# Patient Record
Sex: FEMALE
[De-identification: names, ages, dates, MRNs, and addresses within clinical notes are randomized per-mention and may not be internally consistent; named-entity substitution may affect disease eponyms.]

---

## 2018-06-20 ENCOUNTER — HOSPITAL ENCOUNTER (OUTPATIENT)
Dept: HOSPITAL 88 - US | Age: 23
End: 2018-06-20
Payer: COMMERCIAL

## 2018-06-20 DIAGNOSIS — I10: Primary | ICD-10-CM

## 2018-06-20 DIAGNOSIS — J30.0: ICD-10-CM

## 2018-06-20 DIAGNOSIS — J02.9: ICD-10-CM

## 2018-06-20 PROCEDURE — 76770 US EXAM ABDO BACK WALL COMP: CPT

## 2018-06-20 PROCEDURE — 93976 VASCULAR STUDY: CPT

## 2018-06-20 PROCEDURE — 93306 TTE W/DOPPLER COMPLETE: CPT

## 2018-06-20 PROCEDURE — 71046 X-RAY EXAM CHEST 2 VIEWS: CPT

## 2018-06-20 NOTE — DIAGNOSTIC IMAGING REPORT
PROCEDURE:US RETROPERITONEAL ( KIDNEY ).

COMPARISON:None.

INDICATIONS:Hypertension

TECHNIQUE: Grey-scale and color sonographic images of the bilateral 

kidneys and bladder where obtained in transverse and longitudinal 

planes.

 

FINDINGS:

 

RIGHT KIDNEY: 13 x 4.6 x 4.5 cm, cortex 2 cm

Cysts: None

Solid masses: None

Stones: None

Hydronephrosis: Absent

Echogenicity: Normal

 

LEFT KIDNEY: 12.4 x 5.7 x 6 cm, cortex 2.4 cm

Cysts: None

Solid masses: None

Stones: None

Hydronephrosis: Absent

Echogenicity: Normal

 

Bladder: Unremarkable. Both ureteral jets visualized.

 

CONCLUSION:

Unremarkable renal ultrasound. 

 

Dictated by:  Pedrito Alexander M.D. on 6/20/2018 at 10:54     

Electronically approved by:  Pedrito Alexander M.D. on 6/20/2018 at 10:54

## 2018-06-20 NOTE — DIAGNOSTIC IMAGING REPORT
PROCEDURE: RENAL DOPPLER ULTRASOUND

 

COMPARISON:None.

 

INDICATIONS:Hypertension

 

FINDINGS:

Multiple sagittal and axial images of the right and left kidneys were 

obtained.

 

RIGHT KIDNEY:

The right kidney measures 13 cm. The cortical thickness is 2 cm.

The right kidney has normal echogenicity. There are no masses, 

hydronephrosis or calculi.

 

The highest right main renal artery PSV is 150 cm/sec.

The highest right segmental artery PSV is 110 cm/sec.

 

LEFT KIDNEY:

The left kidney measures 12.4 cm. The cortical thickness is 2.4 cm.

The left kidney has normal echogenicity. There are no masses, 

hydronephrosis or calculi.

 

The highest left main renal artery PSV is 185 cm/sec.

The highest left segmental artery PSV is 99 cm/sec.

 

The abdominal aorta PSV is 51 cm/sec.

The right renal artery/aorta ratio is 3.

The left renal artery/aorta ratio is 3.6.

 

The right and left renal veins are patent.

 

The bladder is unremarkable.

 

CONCLUSION:

 

1. Normal renal ultrasound.

 

2. Elevated left renal artery velocities and renal artery/aorta ratio, 

compatible with left renal artery stenosis.

 

3. Borderline elevated right renal artery/aorta ratio may suggest a 

milder degree of stenosis. 

 

 

Dictated by:  Pedrito Alexander M.D. on 6/20/2018 at 11:00     

Electronically approved by:  Pedrito Alexander M.D. on 6/20/2018 at 11:00

## 2018-06-20 NOTE — DIAGNOSTIC IMAGING REPORT
PROCEDURE: X-RAY CHEST, TWO VIEWS

COMPARISON: None.

INDICATIONS: ACUTE PHARYNGITIS/HTN

 

FINDINGS:



LUNGS: No consolidations or edema.

 

PLEURA: No effusions or pneumothorax.

 

HEART \T\ 

MEDIASTINUM: The heart is within normal size-limits.

 

BONES \T\

SOFT TISSUES:  No acute findings.

 

 

CONCLUSION:

No acute thoracic abnormality. 

 

 

 

Bayron Kang D.O.  

Dictated by:  Bayron Kang D.O. on 6/20/2018 at 9:28     

Electronically approved by:  Bayron Kang D.O. on 6/20/2018 at 9:28